# Patient Record
Sex: MALE
[De-identification: names, ages, dates, MRNs, and addresses within clinical notes are randomized per-mention and may not be internally consistent; named-entity substitution may affect disease eponyms.]

---

## 2024-01-26 ENCOUNTER — APPOINTMENT (OUTPATIENT)
Dept: ORTHOPEDIC SURGERY | Facility: CLINIC | Age: 69
End: 2024-01-26
Payer: MEDICARE

## 2024-01-26 PROBLEM — Z00.00 ENCOUNTER FOR PREVENTIVE HEALTH EXAMINATION: Status: ACTIVE | Noted: 2024-01-26

## 2024-01-26 PROCEDURE — 20610 DRAIN/INJ JOINT/BURSA W/O US: CPT | Mod: LT

## 2024-01-26 PROCEDURE — 99203 OFFICE O/P NEW LOW 30 MIN: CPT | Mod: 25

## 2024-01-26 RX ORDER — NAPROXEN 500 MG/1
500 TABLET ORAL
Qty: 60 | Refills: 0 | Status: ACTIVE | COMMUNITY
Start: 2024-01-26 | End: 1900-01-01

## 2024-01-26 NOTE — HISTORY OF PRESENT ILLNESS
[de-identified] : Patient is a 68-year-old male here for evaluation of left knee pain.  Patient has been experiencing left knee pain for about a year or so with no injury or trauma.  Patient has history of right total knee replacement about 10 years ago.  Patient states left knee pain worsens with activities, weather change.

## 2024-01-26 NOTE — IMAGING
[de-identified] : Left knee exam: No effusion, no ecchymosis, no erythema, tenderness palpation medial joint line, range of motion from 0 degrees to 115 degrees with mild discomfort, ligamentously intact, neurovascular intact

## 2024-01-26 NOTE — DISCUSSION/SUMMARY
[de-identified] : Discussed left knee x-rays from outside radiology showing moderate osteoarthritis of left knee with no acute fractures, subluxations, dislocations.  Discussed with patient that he has osteoarthritis of the left knee.  Discussed treatment option detail including rest, ice that she can range of motion exercise, physical therapy.  Naproxen sent to patient's pharmacy, discuss side effects in detail.  Plan is for cortisone injection left knee.  Patient will follow-up in 8 to 10 weeks for reevaluation, possible consult on viscosupplementation injections.  Patient understands agrees with plan.  Anesthesia: ethyl chloride sprayed topically.    Dexamethasone 20mg per 5mL 2cc    Lidocaine 1% 2cc         Medication was injected in the LT knee after verbal consent using sterile preparation and technique. The risks, benefits, and alternatives to cortisone injection were explained in full to the patient. Risks outlined include but are not limited to infection, sepsis, bleeding, scarring, skin discoloration, temporary increase in pain, syncopal episode, failure to resolve symptoms, allergic reaction, symptom recurrence, and elevation of blood sugar in diabetics. Patient understood the risks. All questions were answered. After discussion of options, patient requested an injection. Oral informed consent was obtained and sterile prep was done of the injection site. Sterile technique was utilized for the procedure including the preparation of the solutions used for the injection. Patient tolerated the procedure well. Advised to ice the injection site this evening.

## 2024-03-22 ENCOUNTER — APPOINTMENT (OUTPATIENT)
Dept: ORTHOPEDIC SURGERY | Facility: CLINIC | Age: 69
End: 2024-03-22
Payer: MEDICARE

## 2024-03-22 PROCEDURE — 99213 OFFICE O/P EST LOW 20 MIN: CPT

## 2024-03-22 RX ORDER — HYLAN G-F 20 16MG/2ML
48 SYRINGE (ML) INTRAARTICULAR
Qty: 1 | Refills: 0 | Status: ACTIVE | OUTPATIENT
Start: 2024-03-22

## 2024-03-22 NOTE — HISTORY OF PRESENT ILLNESS
[de-identified] : Patient is a 60-year-old male here for reevaluation of left knee osteoarthritis.  Patient states last visit cortisone injection only helped him for about 2 weeks.  Denies reinjury/trauma, numbness or sign, instability.  Patient denies pain, especially worsens with activities.

## 2024-03-22 NOTE — IMAGING
[de-identified] : Left knee exam: No effusion, ecchymosis, no erythema, tenderness palpation the medial joint line, good range of motion with pain to medial joint line, good strength, ligaments intact, neurovascular intact

## 2024-05-03 ENCOUNTER — APPOINTMENT (OUTPATIENT)
Dept: ORTHOPEDIC SURGERY | Facility: CLINIC | Age: 69
End: 2024-05-03
Payer: MEDICARE

## 2024-05-03 DIAGNOSIS — M17.12 UNILATERAL PRIMARY OSTEOARTHRITIS, LEFT KNEE: ICD-10-CM

## 2024-05-03 PROCEDURE — 20610 DRAIN/INJ JOINT/BURSA W/O US: CPT | Mod: LT

## 2024-05-03 PROCEDURE — 99213 OFFICE O/P EST LOW 20 MIN: CPT | Mod: 25

## 2024-05-03 NOTE — IMAGING
[de-identified] : Left knee exam: No effusion, ecchymosis, erythema joint line tenderness, discomfort range of motion, no gross instability, neurovascular intact

## 2024-05-03 NOTE — DISCUSSION/SUMMARY
[de-identified] : An injection of Synvisc was injected into right knee after verbal consent using sterile technique. The risks, benefits, and alternatives to Visco-supplementation injection were explained in full to the patient. Risks outlined include but are not limited to infection, sepsis, bleeding, scarring, skin discoloration, temporary increase in pain, syncopal episode, failure to resolve symptoms, allergic reaction, and symptom recurrence. Signs and symptoms of infection reviewed, and patients advised to call immediately for redness, fevers, and/or chills. Patient understood the risks. All questions were answered. Sterile technique was used without complications. The patient tolerated the procedure well. Ice tonight to the injection site.        Follow-up 4 to 6 months

## 2024-05-03 NOTE — HISTORY OF PRESENT ILLNESS
[de-identified] : Patient is a 68-year-old male here for reevaluation of left knee after Synvisc 1 injection.  Denies changes symptoms

## 2025-03-06 NOTE — DISCUSSION/SUMMARY
[de-identified] : Discussed with patient detail that he has moderate osteoarthritis of left knee, discussed prior x-ray images.  Plan is for viscosupplementation injection left knee.  Synvisc 1 injection ordered.  Patient will follow-up for reevaluation injection.  Patient understands agrees with plan. 169.9